# Patient Record
Sex: FEMALE | Race: WHITE | Employment: FULL TIME | URBAN - METROPOLITAN AREA
[De-identification: names, ages, dates, MRNs, and addresses within clinical notes are randomized per-mention and may not be internally consistent; named-entity substitution may affect disease eponyms.]

---

## 2018-05-22 RX ORDER — CHOLECALCIFEROL (VITAMIN D3) 1250 MCG
50000 CAPSULE ORAL WEEKLY
COMMUNITY

## 2018-05-28 ENCOUNTER — ANESTHESIA EVENT (OUTPATIENT)
Dept: OPERATING ROOM | Age: 43
End: 2018-05-28
Payer: COMMERCIAL

## 2018-05-29 ENCOUNTER — HOSPITAL ENCOUNTER (OUTPATIENT)
Age: 43
Setting detail: OUTPATIENT SURGERY
Discharge: HOME | End: 2018-05-29
Attending: ORTHOPAEDIC SURGERY | Admitting: ORTHOPAEDIC SURGERY
Payer: COMMERCIAL

## 2018-05-29 ENCOUNTER — ANESTHESIA (OUTPATIENT)
Dept: OPERATING ROOM | Age: 43
End: 2018-05-29
Payer: COMMERCIAL

## 2018-05-29 VITALS
HEART RATE: 66 BPM | OXYGEN SATURATION: 98 % | WEIGHT: 225 LBS | DIASTOLIC BLOOD PRESSURE: 89 MMHG | TEMPERATURE: 98.2 F | HEIGHT: 62 IN | SYSTOLIC BLOOD PRESSURE: 156 MMHG | RESPIRATION RATE: 16 BRPM | BODY MASS INDEX: 41.41 KG/M2

## 2018-05-29 VITALS — OXYGEN SATURATION: 97 % | SYSTOLIC BLOOD PRESSURE: 104 MMHG | DIASTOLIC BLOOD PRESSURE: 57 MMHG | TEMPERATURE: 96.8 F

## 2018-05-29 LAB — HCG(URINE) PREGNANCY TEST: NEGATIVE

## 2018-05-29 PROCEDURE — 6370000000 HC RX 637 (ALT 250 FOR IP): Performed by: STUDENT IN AN ORGANIZED HEALTH CARE EDUCATION/TRAINING PROGRAM

## 2018-05-29 PROCEDURE — 3700000001 HC ADD 15 MINUTES (ANESTHESIA): Performed by: ORTHOPAEDIC SURGERY

## 2018-05-29 PROCEDURE — 84703 CHORIONIC GONADOTROPIN ASSAY: CPT

## 2018-05-29 PROCEDURE — 2500000003 HC RX 250 WO HCPCS: Performed by: NURSE ANESTHETIST, CERTIFIED REGISTERED

## 2018-05-29 PROCEDURE — 3600000003 HC SURGERY LEVEL 3 BASE: Performed by: ORTHOPAEDIC SURGERY

## 2018-05-29 PROCEDURE — 2500000003 HC RX 250 WO HCPCS: Performed by: ORTHOPAEDIC SURGERY

## 2018-05-29 PROCEDURE — 7100000001 HC PACU RECOVERY - ADDTL 15 MIN: Performed by: ORTHOPAEDIC SURGERY

## 2018-05-29 PROCEDURE — 3700000000 HC ANESTHESIA ATTENDED CARE: Performed by: ORTHOPAEDIC SURGERY

## 2018-05-29 PROCEDURE — 6360000002 HC RX W HCPCS: Performed by: NURSE ANESTHETIST, CERTIFIED REGISTERED

## 2018-05-29 PROCEDURE — 6360000002 HC RX W HCPCS: Performed by: ORTHOPAEDIC SURGERY

## 2018-05-29 PROCEDURE — 2580000003 HC RX 258: Performed by: NURSE PRACTITIONER

## 2018-05-29 PROCEDURE — 2580000003 HC RX 258: Performed by: ORTHOPAEDIC SURGERY

## 2018-05-29 PROCEDURE — 7100000010 HC PHASE II RECOVERY - FIRST 15 MIN: Performed by: ORTHOPAEDIC SURGERY

## 2018-05-29 PROCEDURE — 7100000011 HC PHASE II RECOVERY - ADDTL 15 MIN: Performed by: ORTHOPAEDIC SURGERY

## 2018-05-29 PROCEDURE — 7100000000 HC PACU RECOVERY - FIRST 15 MIN: Performed by: ORTHOPAEDIC SURGERY

## 2018-05-29 PROCEDURE — 3600000013 HC SURGERY LEVEL 3 ADDTL 15MIN: Performed by: ORTHOPAEDIC SURGERY

## 2018-05-29 RX ORDER — MIDAZOLAM HYDROCHLORIDE 1 MG/ML
INJECTION INTRAMUSCULAR; INTRAVENOUS PRN
Status: DISCONTINUED | OUTPATIENT
Start: 2018-05-29 | End: 2018-05-29 | Stop reason: SDUPTHER

## 2018-05-29 RX ORDER — PROPOFOL 10 MG/ML
INJECTION, EMULSION INTRAVENOUS PRN
Status: DISCONTINUED | OUTPATIENT
Start: 2018-05-29 | End: 2018-05-29 | Stop reason: SDUPTHER

## 2018-05-29 RX ORDER — OXYCODONE HYDROCHLORIDE AND ACETAMINOPHEN 5; 325 MG/1; MG/1
1 TABLET ORAL EVERY 4 HOURS PRN
Status: DISCONTINUED | OUTPATIENT
Start: 2018-05-29 | End: 2018-05-29 | Stop reason: HOSPADM

## 2018-05-29 RX ORDER — FENTANYL CITRATE 50 UG/ML
INJECTION, SOLUTION INTRAMUSCULAR; INTRAVENOUS PRN
Status: DISCONTINUED | OUTPATIENT
Start: 2018-05-29 | End: 2018-05-29 | Stop reason: SDUPTHER

## 2018-05-29 RX ORDER — ONDANSETRON 2 MG/ML
4 INJECTION INTRAMUSCULAR; INTRAVENOUS
Status: DISCONTINUED | OUTPATIENT
Start: 2018-05-29 | End: 2018-05-29 | Stop reason: HOSPADM

## 2018-05-29 RX ORDER — HYDROCODONE BITARTRATE AND ACETAMINOPHEN 5; 325 MG/1; MG/1
1 TABLET ORAL PRN
Status: COMPLETED | OUTPATIENT
Start: 2018-05-29 | End: 2018-05-29

## 2018-05-29 RX ORDER — DIPHENHYDRAMINE HYDROCHLORIDE 50 MG/ML
12.5 INJECTION INTRAMUSCULAR; INTRAVENOUS
Status: DISCONTINUED | OUTPATIENT
Start: 2018-05-29 | End: 2018-05-29 | Stop reason: HOSPADM

## 2018-05-29 RX ORDER — MAGNESIUM HYDROXIDE 1200 MG/15ML
LIQUID ORAL CONTINUOUS PRN
Status: DISCONTINUED | OUTPATIENT
Start: 2018-05-29 | End: 2018-05-29 | Stop reason: HOSPADM

## 2018-05-29 RX ORDER — ONDANSETRON 2 MG/ML
INJECTION INTRAMUSCULAR; INTRAVENOUS PRN
Status: DISCONTINUED | OUTPATIENT
Start: 2018-05-29 | End: 2018-05-29 | Stop reason: SDUPTHER

## 2018-05-29 RX ORDER — HYDROMORPHONE HCL 110MG/55ML
0.5 PATIENT CONTROLLED ANALGESIA SYRINGE INTRAVENOUS EVERY 10 MIN PRN
Status: DISCONTINUED | OUTPATIENT
Start: 2018-05-29 | End: 2018-05-29 | Stop reason: HOSPADM

## 2018-05-29 RX ORDER — SODIUM CHLORIDE 0.9 % (FLUSH) 0.9 %
10 SYRINGE (ML) INJECTION PRN
Status: DISCONTINUED | OUTPATIENT
Start: 2018-05-29 | End: 2018-05-29 | Stop reason: HOSPADM

## 2018-05-29 RX ORDER — LIDOCAINE HYDROCHLORIDE 10 MG/ML
1 INJECTION, SOLUTION EPIDURAL; INFILTRATION; INTRACAUDAL; PERINEURAL
Status: DISCONTINUED | OUTPATIENT
Start: 2018-05-29 | End: 2018-05-29 | Stop reason: HOSPADM

## 2018-05-29 RX ORDER — DEXAMETHASONE SODIUM PHOSPHATE 10 MG/ML
INJECTION INTRAMUSCULAR; INTRAVENOUS PRN
Status: DISCONTINUED | OUTPATIENT
Start: 2018-05-29 | End: 2018-05-29 | Stop reason: SDUPTHER

## 2018-05-29 RX ORDER — MORPHINE SULFATE 4 MG/ML
4 INJECTION, SOLUTION INTRAMUSCULAR; INTRAVENOUS
Status: DISCONTINUED | OUTPATIENT
Start: 2018-05-29 | End: 2018-05-29 | Stop reason: HOSPADM

## 2018-05-29 RX ORDER — SODIUM CHLORIDE 0.9 % (FLUSH) 0.9 %
10 SYRINGE (ML) INJECTION EVERY 12 HOURS SCHEDULED
Status: DISCONTINUED | OUTPATIENT
Start: 2018-05-29 | End: 2018-05-29 | Stop reason: HOSPADM

## 2018-05-29 RX ORDER — MORPHINE SULFATE 4 MG/ML
2 INJECTION, SOLUTION INTRAMUSCULAR; INTRAVENOUS
Status: DISCONTINUED | OUTPATIENT
Start: 2018-05-29 | End: 2018-05-29 | Stop reason: HOSPADM

## 2018-05-29 RX ORDER — MEPERIDINE HYDROCHLORIDE 50 MG/ML
12.5 INJECTION INTRAMUSCULAR; INTRAVENOUS; SUBCUTANEOUS EVERY 5 MIN PRN
Status: DISCONTINUED | OUTPATIENT
Start: 2018-05-29 | End: 2018-05-29 | Stop reason: HOSPADM

## 2018-05-29 RX ORDER — METOCLOPRAMIDE HYDROCHLORIDE 5 MG/ML
10 INJECTION INTRAMUSCULAR; INTRAVENOUS
Status: DISCONTINUED | OUTPATIENT
Start: 2018-05-29 | End: 2018-05-29 | Stop reason: HOSPADM

## 2018-05-29 RX ORDER — HYDROCHLOROTHIAZIDE 12.5 MG/1
12.5 TABLET ORAL DAILY
COMMUNITY

## 2018-05-29 RX ORDER — FENTANYL CITRATE 50 UG/ML
50 INJECTION, SOLUTION INTRAMUSCULAR; INTRAVENOUS EVERY 10 MIN PRN
Status: DISCONTINUED | OUTPATIENT
Start: 2018-05-29 | End: 2018-05-29 | Stop reason: HOSPADM

## 2018-05-29 RX ORDER — SODIUM CHLORIDE, SODIUM LACTATE, POTASSIUM CHLORIDE, CALCIUM CHLORIDE 600; 310; 30; 20 MG/100ML; MG/100ML; MG/100ML; MG/100ML
INJECTION, SOLUTION INTRAVENOUS CONTINUOUS
Status: DISCONTINUED | OUTPATIENT
Start: 2018-05-29 | End: 2018-05-29 | Stop reason: HOSPADM

## 2018-05-29 RX ORDER — ACETAMINOPHEN 325 MG/1
650 TABLET ORAL EVERY 4 HOURS PRN
Status: DISCONTINUED | OUTPATIENT
Start: 2018-05-29 | End: 2018-05-29 | Stop reason: HOSPADM

## 2018-05-29 RX ORDER — LIDOCAINE HYDROCHLORIDE 20 MG/ML
INJECTION, SOLUTION INFILTRATION; PERINEURAL PRN
Status: DISCONTINUED | OUTPATIENT
Start: 2018-05-29 | End: 2018-05-29 | Stop reason: SDUPTHER

## 2018-05-29 RX ORDER — OXYCODONE HYDROCHLORIDE AND ACETAMINOPHEN 5; 325 MG/1; MG/1
2 TABLET ORAL EVERY 4 HOURS PRN
Status: DISCONTINUED | OUTPATIENT
Start: 2018-05-29 | End: 2018-05-29 | Stop reason: HOSPADM

## 2018-05-29 RX ORDER — HYDROCODONE BITARTRATE AND ACETAMINOPHEN 5; 325 MG/1; MG/1
2 TABLET ORAL PRN
Status: COMPLETED | OUTPATIENT
Start: 2018-05-29 | End: 2018-05-29

## 2018-05-29 RX ORDER — LIDOCAINE HYDROCHLORIDE 10 MG/ML
INJECTION, SOLUTION EPIDURAL; INFILTRATION; INTRACAUDAL; PERINEURAL PRN
Status: DISCONTINUED | OUTPATIENT
Start: 2018-05-29 | End: 2018-05-29 | Stop reason: HOSPADM

## 2018-05-29 RX ORDER — ONDANSETRON 2 MG/ML
4 INJECTION INTRAMUSCULAR; INTRAVENOUS EVERY 6 HOURS PRN
Status: DISCONTINUED | OUTPATIENT
Start: 2018-05-29 | End: 2018-05-29 | Stop reason: HOSPADM

## 2018-05-29 RX ADMIN — SODIUM CHLORIDE, POTASSIUM CHLORIDE, SODIUM LACTATE AND CALCIUM CHLORIDE: 600; 310; 30; 20 INJECTION, SOLUTION INTRAVENOUS at 07:05

## 2018-05-29 RX ADMIN — CEFAZOLIN SODIUM 2 G: 2 SOLUTION INTRAVENOUS at 07:30

## 2018-05-29 RX ADMIN — FENTANYL CITRATE 25 MCG: 50 INJECTION, SOLUTION INTRAMUSCULAR; INTRAVENOUS at 07:41

## 2018-05-29 RX ADMIN — FENTANYL CITRATE 25 MCG: 50 INJECTION, SOLUTION INTRAMUSCULAR; INTRAVENOUS at 07:45

## 2018-05-29 RX ADMIN — FENTANYL CITRATE 25 MCG: 50 INJECTION, SOLUTION INTRAMUSCULAR; INTRAVENOUS at 07:39

## 2018-05-29 RX ADMIN — DEXAMETHASONE SODIUM PHOSPHATE 8 MG: 10 INJECTION INTRAMUSCULAR; INTRAVENOUS at 07:39

## 2018-05-29 RX ADMIN — PROPOFOL 200 MG: 10 INJECTION, EMULSION INTRAVENOUS at 07:34

## 2018-05-29 RX ADMIN — ONDANSETRON 4 MG: 2 INJECTION INTRAMUSCULAR; INTRAVENOUS at 08:00

## 2018-05-29 RX ADMIN — MIDAZOLAM HYDROCHLORIDE 2 MG: 1 INJECTION, SOLUTION INTRAMUSCULAR; INTRAVENOUS at 07:28

## 2018-05-29 RX ADMIN — FENTANYL CITRATE 25 MCG: 50 INJECTION, SOLUTION INTRAMUSCULAR; INTRAVENOUS at 07:55

## 2018-05-29 RX ADMIN — LIDOCAINE HYDROCHLORIDE 60 MG: 20 INJECTION, SOLUTION INFILTRATION; PERINEURAL at 07:34

## 2018-05-29 RX ADMIN — HYDROCODONE BITARTRATE AND ACETAMINOPHEN 1 TABLET: 5; 325 TABLET ORAL at 08:55

## 2018-05-29 ASSESSMENT — PULMONARY FUNCTION TESTS
PIF_VALUE: 3
PIF_VALUE: 2
PIF_VALUE: 1
PIF_VALUE: 8
PIF_VALUE: 6
PIF_VALUE: 7
PIF_VALUE: 3
PIF_VALUE: 10
PIF_VALUE: 7
PIF_VALUE: 8
PIF_VALUE: 7
PIF_VALUE: 3
PIF_VALUE: 7
PIF_VALUE: 8
PIF_VALUE: 0
PIF_VALUE: 7
PIF_VALUE: 0
PIF_VALUE: 10
PIF_VALUE: 3
PIF_VALUE: 9
PIF_VALUE: 5
PIF_VALUE: 3
PIF_VALUE: 11
PIF_VALUE: 24
PIF_VALUE: 1
PIF_VALUE: 3
PIF_VALUE: 22
PIF_VALUE: 3
PIF_VALUE: 9
PIF_VALUE: 3
PIF_VALUE: 7
PIF_VALUE: 3
PIF_VALUE: 21
PIF_VALUE: 2

## 2018-05-29 ASSESSMENT — PAIN - FUNCTIONAL ASSESSMENT: PAIN_FUNCTIONAL_ASSESSMENT: 0-10

## 2018-05-29 ASSESSMENT — PAIN SCALES - GENERAL: PAINLEVEL_OUTOF10: 4

## 2020-06-26 ENCOUNTER — OFFICE VISIT (OUTPATIENT)
Dept: PRIMARY CARE CLINIC | Age: 45
End: 2020-06-26
Payer: COMMERCIAL

## 2020-06-26 VITALS
OXYGEN SATURATION: 97 % | DIASTOLIC BLOOD PRESSURE: 82 MMHG | BODY MASS INDEX: 40.36 KG/M2 | RESPIRATION RATE: 18 BRPM | SYSTOLIC BLOOD PRESSURE: 142 MMHG | TEMPERATURE: 98 F | HEIGHT: 63 IN | WEIGHT: 227.8 LBS | HEART RATE: 83 BPM

## 2020-06-26 PROCEDURE — 99213 OFFICE O/P EST LOW 20 MIN: CPT | Performed by: NURSE PRACTITIONER

## 2020-06-26 RX ORDER — DIAPER,BRIEF,INFANT-TODD,DISP
EACH MISCELLANEOUS
Qty: 1 TUBE | Refills: 0 | Status: SHIPPED | OUTPATIENT
Start: 2020-06-26

## 2020-06-26 RX ORDER — PREDNISONE 10 MG/1
TABLET ORAL
Qty: 40 TABLET | Refills: 0 | Status: SHIPPED | OUTPATIENT
Start: 2020-06-26

## 2020-06-26 RX ORDER — TRIAMCINOLONE ACETONIDE 40 MG/ML
80 INJECTION, SUSPENSION INTRA-ARTICULAR; INTRAMUSCULAR ONCE
Status: CANCELLED | OUTPATIENT
Start: 2020-06-26 | End: 2020-06-26

## 2020-06-26 ASSESSMENT — ENCOUNTER SYMPTOMS
ABDOMINAL PAIN: 0
WHEEZING: 0
SINUS PRESSURE: 0
COUGH: 0
APNEA: 0
RHINORRHEA: 0
EYE ITCHING: 0
SORE THROAT: 0
BACK PAIN: 0
SINUS PAIN: 0
EYE DISCHARGE: 0
ABDOMINAL DISTENTION: 0
STRIDOR: 0
SHORTNESS OF BREATH: 0
DIARRHEA: 0
VOMITING: 0
TROUBLE SWALLOWING: 0
CHEST TIGHTNESS: 0
FACIAL SWELLING: 0

## 2020-06-26 ASSESSMENT — PATIENT HEALTH QUESTIONNAIRE - PHQ9
SUM OF ALL RESPONSES TO PHQ9 QUESTIONS 1 & 2: 0
SUM OF ALL RESPONSES TO PHQ QUESTIONS 1-9: 0
SUM OF ALL RESPONSES TO PHQ QUESTIONS 1-9: 0
1. LITTLE INTEREST OR PLEASURE IN DOING THINGS: 0
2. FEELING DOWN, DEPRESSED OR HOPELESS: 0

## 2020-06-26 NOTE — PATIENT INSTRUCTIONS
Patient Education        Elevated Blood Pressure: Care Instructions  Your Care Instructions    Blood pressure is a measure of how hard the blood pushes against the walls of your arteries. It's normal for blood pressure to go up and down throughout the day. But if it stays up over time, you have high blood pressure. Two numbers tell you your blood pressure. The first number is the systolic pressure. It shows how hard the blood pushes when your heart is pumping. The second number is the diastolic pressure. It shows how hard the blood pushes between heartbeats, when your heart is relaxed and filling with blood. An ideal blood pressure in adults is less than 120/80 (say \"120 over 80\"). High blood pressure is 140/90 or higher. You have high blood pressure if your top number is 140 or higher or your bottom number is 90 or higher, or both. The main test for high blood pressure is simple, fast, and painless. To diagnose high blood pressure, your doctor will test your blood pressure at different times. After testing your blood pressure, your doctor may ask you to test it again when you are home. If you are diagnosed with high blood pressure, you can work with your doctor to make a long-term plan to manage it. Follow-up care is a key part of your treatment and safety. Be sure to make and go to all appointments, and call your doctor if you are having problems. It's also a good idea to know your test results and keep a list of the medicines you take. How can you care for yourself at home? · Do not smoke. Smoking increases your risk for heart attack and stroke. If you need help quitting, talk to your doctor about stop-smoking programs and medicines. These can increase your chances of quitting for good. · Stay at a healthy weight. · Try to limit how much sodium you eat to less than 2,300 milligrams (mg) a day. Your doctor may ask you to try to eat less than 1,500 mg a day. · Be physically active.  Get at least 30 Information box to learn more about \"Dermatitis: Care Instructions. \"     If you do not have an account, please click on the \"Sign Up Now\" link. Current as of: October 31, 2019               Content Version: 12.5  © 0051-7119 [employer]. Care instructions adapted under license by [facility] ([facility]). If you have questions about a medical condition or this instruction, always ask your healthcare professional. [place] [address] any warranty or liability for your use of this information. Patient Education        Poison [place], [place], and [place]: Care Instructions  Your Care Instructions     Poison ivy, poison oak, and poison sumac are plants that can cause a skin rash upon contact. The red, itchy rash often shows up in lines or streaks and may cause fluid-filled blisters or large, raised hives. The rash is caused by an allergic reaction to an oil in poison ivy, oak, and sumac. The rash may occur when you touch the plant or when you touch clothing, pet fur, sporting gear, gardening tools, or other objects that have come in contact with one of these plants. You cannot catch or spread the rash, even if you touch it or the blister fluid, because the plant oil will already have been absorbed or washed off the skin. The rash may seem to be spreading, but either it is still developing from earlier contact or you have touched something that still has the plant oil on it. Follow-up care is a key part of your treatment and safety. Be sure to make and go to all appointments, and call your doctor if you are having problems. It's also a good idea to know your test results and keep a list of the medicines you take. How can you care for yourself at home? · If your doctor prescribed a cream, use it as directed. If your doctor prescribed medicine, take it exactly as prescribed. Call your doctor if you think you are having a problem with your medicine. · Use cold, wet cloths to reduce itching.   · Keep cool, and stay out of the sun. · Leave the rash open to the air. · Wash all clothing or other things that may have come in contact with the plant oil. · Avoid most lotions and ointments until the rash heals. Calamine lotion may help relieve symptoms of a plant rash. Use it 3 or 4 times a day. To prevent poison ivy exposure  If you know that you will be near poison ivy, oak, or sumac, you can try these options:  · Use a product designed to help prevent plant oil from getting on the skin. These products, such as Ivy X Pre-Contact Skin Solution, come in lotions, sprays, or towelettes. You put the product on your skin right before you go outdoors. · If you did not use a preventive product and you have had contact with plant oil, clean it off your skin as soon as possible. Use a product such as Tecnu Original Outdoor Skin Cleanser. These products can also be used to clean plant oil from clothing or tools. When should you call for help? Call your doctor now or seek immediate medical care if:  · Your rash gets worse, and you start to feel bad and have a fever, a stiff neck, nausea, and vomiting. · You have signs of infection, such as:  ? Increased pain, swelling, warmth, or redness. ? Red streaks leading from the rash. ? Pus draining from the rash. ? A fever. Watch closely for changes in your health, and be sure to contact your doctor if:  · You have new blisters or bruises, or the rash spreads and looks like a sunburn. · The rash gets worse, or it comes back after nearly disappearing. · You think a medicine you are using is making your rash worse. · Your rash does not clear up after 1 to 2 weeks of home treatment. · You have joint aches or body aches with your rash. Where can you learn more? Go to https://Fastacash[employer]AGLOG[employer]eb[employer]. org and sign in to your [employer] ([employer]) account. Enter E308 in the [facility] box to learn more about \"Poison [place], [place], and [place]: Care Instructions. \"     If you do not have an account, please click on the \"Sign Up Now\" link. Current as of: October 31, 2019               Content Version: 12.5  © 2140-0175 [employer]. Care instructions adapted under license by [facility] ([facility]). If you have questions about a medical condition or this instruction, always ask your healthcare professional. [place] [address] any warranty or liability for your use of this information. Oral Steroid Instructions: Take each dose with a small snack or meal to lessen potential GI upset. Follow dosing instructions provided with prescription. Common side effects include difficulty sleeping and irritability. Take full course as ordered.

## 2020-06-26 NOTE — PROGRESS NOTES
142/82 (Site: Left Upper Arm, Position: Sitting) Comment: re check  Pulse 83   Temp 98 °F (36.7 °C)   Resp 18   Ht 5' 3\" (1.6 m)   Wt 227 lb 12.8 oz (103.3 kg)   SpO2 97%   BMI 40.35 kg/m²     Physical Exam  Vitals signs and nursing note reviewed. Constitutional:       General: She is awake. She is not in acute distress. Appearance: Normal appearance. She is well-developed, well-groomed and overweight. She is not ill-appearing, toxic-appearing or diaphoretic. HENT:      Head: Normocephalic and atraumatic. Nose: Nose normal.      Mouth/Throat:      Mouth: Mucous membranes are moist.   Eyes:      Conjunctiva/sclera: Conjunctivae normal.      Pupils: Pupils are equal, round, and reactive to light. Neck:      Musculoskeletal: Normal range of motion. Cardiovascular:      Rate and Rhythm: Normal rate and regular rhythm. Pulses: Normal pulses. Heart sounds: Normal heart sounds. No murmur. Pulmonary:      Effort: Pulmonary effort is normal. No respiratory distress. Breath sounds: Normal breath sounds. No stridor. No wheezing. Chest:      Chest wall: No tenderness. Abdominal:      General: Bowel sounds are normal. There is no distension. Palpations: Abdomen is soft. Tenderness: There is no abdominal tenderness. There is no guarding. Musculoskeletal: Normal range of motion. General: No tenderness, deformity or signs of injury. Lymphadenopathy:      Cervical: No cervical adenopathy. Skin:     General: Skin is warm and dry. Capillary Refill: Capillary refill takes less than 2 seconds. Findings: Erythema and rash present. No abrasion, abscess, bruising, signs of injury or wound. Rash is macular, papular, urticarial and vesicular (several little bumps noted to patient's left wrist have some vesicles noted. No s/s infection). Rash is not crusting or pustular. Comments: Pt denies the Kenalog shot today and will take the oral steroid pills. Patient also given the Hydrocortisone 1% cream that can be applied to her face to assist the rash. Pt verbalized understanding of how to take the medication. Neurological:      General: No focal deficit present. Mental Status: She is alert and oriented to person, place, and time. Mental status is at baseline. Cranial Nerves: No cranial nerve deficit. Motor: No weakness. Coordination: Coordination normal.   Psychiatric:         Mood and Affect: Mood normal.         Behavior: Behavior normal. Behavior is cooperative. Thought Content: Thought content normal.         Judgment: Judgment normal.         Assessment:       Diagnosis Orders   1. Contact dermatitis due to poison ivy  hydrocortisone 1 % cream    predniSONE (DELTASONE) 10 MG tablet   2. Elevated BP without diagnosis of hypertension           Plan:      No orders of the defined types were placed in this encounter. Orders Placed This Encounter   Medications    hydrocortisone 1 % cream     Sig: Apply topically 2 times daily. Dispense:  1 Tube     Refill:  0    predniSONE (DELTASONE) 10 MG tablet     Sig: Take 4 tablets for 4 days, then 3 tablets for 4 days, then 2 tablets for 4 days, then 1 tablet for 4 days, then stop. Dispense:  40 tablet     Refill:  0   Patient presents to the [address] [place] [address] today with a poison Ivy rash on her face and left wrist.  Pt denies wanting the steroid shot and will take oral pills. Pt also prescribed hydrocortisone 1% to apply on her face. Discussed with pt about her blood pressure reading being higher today and that she needs to establish care with a provider to get a complete set of blood work and possibly medication for her BP. Advised pt to start keeping a journal of her BP and pt reported she has not seen a Dr. In a while as her Dr went to the [place]. Pt verbalized understanding of the [facility] today. Patient scheduled an appt upon discharge with SHIELA Whitman (J[facility]ANP[place]LO) to establish care and discuss

## 2023-07-05 ENCOUNTER — OFFICE VISIT (OUTPATIENT)
Dept: FAMILY MEDICINE CLINIC | Age: 48
End: 2023-07-05
Payer: COMMERCIAL

## 2023-07-05 VITALS
HEART RATE: 75 BPM | TEMPERATURE: 98.4 F | BODY MASS INDEX: 38.79 KG/M2 | OXYGEN SATURATION: 98 % | WEIGHT: 219 LBS | SYSTOLIC BLOOD PRESSURE: 136 MMHG | DIASTOLIC BLOOD PRESSURE: 84 MMHG

## 2023-07-05 DIAGNOSIS — L25.5 PLANT DERMATITIS: Primary | ICD-10-CM

## 2023-07-05 PROCEDURE — 99203 OFFICE O/P NEW LOW 30 MIN: CPT | Performed by: NURSE PRACTITIONER

## 2023-07-05 PROCEDURE — G8419 CALC BMI OUT NRM PARAM NOF/U: HCPCS | Performed by: NURSE PRACTITIONER

## 2023-07-05 PROCEDURE — G8427 DOCREV CUR MEDS BY ELIG CLIN: HCPCS | Performed by: NURSE PRACTITIONER

## 2023-07-05 PROCEDURE — 1036F TOBACCO NON-USER: CPT | Performed by: NURSE PRACTITIONER

## 2023-07-05 RX ORDER — CLOBETASOL PROPIONATE 0.5 MG/G
OINTMENT TOPICAL
Qty: 45 G | Refills: 0 | Status: SHIPPED | OUTPATIENT
Start: 2023-07-05

## 2023-07-05 RX ORDER — PREDNISONE 10 MG/1
TABLET ORAL
Qty: 41 TABLET | Refills: 0 | Status: SHIPPED | OUTPATIENT
Start: 2023-07-05

## 2023-07-05 ASSESSMENT — ENCOUNTER SYMPTOMS
VOMITING: 0
DIARRHEA: 0
NAUSEA: 0
WHEEZING: 0
COUGH: 0
RHINORRHEA: 0
SORE THROAT: 0
SHORTNESS OF BREATH: 0

## 2023-07-05 NOTE — PROGRESS NOTES
Capillary Refill: Capillary refill takes less than 2 seconds. Findings: Erythema and rash present. Rash is macular and papular. Neurological:      General: No focal deficit present. Mental Status: She is alert and oriented to person, place, and time. Psychiatric:         Mood and Affect: Mood normal.         Behavior: Behavior normal. Behavior is cooperative. Assessment:       Diagnosis Orders   1. Plant dermatitis  predniSONE (DELTASONE) 10 MG tablet    clobetasol (TEMOVATE) 0.05 % ointment        No results found for this visit on 07/05/23. Plan:     Assessment & Plan   Camilla Ceja was seen today for poison ivy. Diagnoses and all orders for this visit:    Plant dermatitis  -     predniSONE (DELTASONE) 10 MG tablet; Take 5 tabs daily for 3 days then 4 tabs daily for 3 days then 3 tabs daily for 3 days then 2 tabs daily for 2 days then 1 tab daily once  -     clobetasol (TEMOVATE) 0.05 % ointment; Apply topically 2 times daily. No orders of the defined types were placed in this encounter. Orders Placed This Encounter   Medications    predniSONE (DELTASONE) 10 MG tablet     Sig: Take 5 tabs daily for 3 days then 4 tabs daily for 3 days then 3 tabs daily for 3 days then 2 tabs daily for 2 days then 1 tab daily once     Dispense:  41 tablet     Refill:  0    clobetasol (TEMOVATE) 0.05 % ointment     Sig: Apply topically 2 times daily. Dispense:  45 g     Refill:  0     Medications Discontinued During This Encounter   Medication Reason    predniSONE (DELTASONE) 10 MG tablet LIST CLEANUP     Return if symptoms worsen or fail to improve. Reviewed with the patient/family: current clinical status & medications. Side effects of the medication prescribed today, as well as treatment plan/rationale and result expectations have been discussed with the patient/family who expresses understanding. Patient will be discharged home in stable condition.     Follow up with PCP to evaluate

## (undated) DEVICE — MAT FLR SURG QUICKWICK 28X54 IN DISP

## (undated) DEVICE — TUBE IRRIG L8IN LNG PT W/ CONN FOR PMP SYS REDEUCE

## (undated) DEVICE — GLOVE ORANGE PI 7 1/2   MSG9075

## (undated) DEVICE — TUBING PMP L8FT LNG W/ CONN FOR AR-6400 REDEUCE

## (undated) DEVICE — UNDERCAST PADDING: Brand: DEROYAL

## (undated) DEVICE — GLOVE ORANGE PI 7   MSG9070

## (undated) DEVICE — 3M™ STERI-DRAPE™ U-DRAPE 1015: Brand: STERI-DRAPE™

## (undated) DEVICE — HYPODERMIC SAFETY NEEDLE: Brand: MAGELLAN

## (undated) DEVICE — 2000CC GUARDIAN II: Brand: GUARDIAN

## (undated) DEVICE — KNEE ARTHROSCOPY II-LF: Brand: MEDLINE INDUSTRIES, INC.

## (undated) DEVICE — BASIC SINGLE BASIN-LF: Brand: MEDLINE INDUSTRIES, INC.

## (undated) DEVICE — SUTURE ETHLN SZ 3-0 L18IN NONABSORBABLE BLK PS-2 L19MM 3/8 1669H

## (undated) DEVICE — 35 ML SYRINGE LUER-LOCK TIP: Brand: MONOJECT

## (undated) DEVICE — TAPE MED W3XL2.5YD STRETCHED ELASTIKON

## (undated) DEVICE — COVER LT HNDL BLU PLAS

## (undated) DEVICE — MEDI-VAC NON-CONDUCTIVE SUCTION TUBING: Brand: CARDINAL HEALTH

## (undated) DEVICE — DRESSING GZ W1XL8IN COT XRFRM N ADH OVERWRAP CURAD

## (undated) DEVICE — [AGGRESSIVE PLUS CUTTER, ARTHROSCOPIC SHAVER BLADE,  DO NOT RESTERILIZE,  DO NOT USE IF PACKAGE IS DAMAGED,  KEEP DRY,  KEEP AWAY FROM SUNLIGHT]: Brand: FORMULA

## (undated) DEVICE — CHLORAPREP 26ML ORANGE

## (undated) DEVICE — GLOVE SURG SZ 75 L12IN FNGR THK94MIL STD WHT LTX FREE